# Patient Record
Sex: FEMALE | Race: WHITE | NOT HISPANIC OR LATINO | Employment: OTHER | URBAN - METROPOLITAN AREA
[De-identification: names, ages, dates, MRNs, and addresses within clinical notes are randomized per-mention and may not be internally consistent; named-entity substitution may affect disease eponyms.]

---

## 2022-04-01 ENCOUNTER — APPOINTMENT (OUTPATIENT)
Dept: GENERAL RADIOLOGY | Facility: CLINIC | Age: 82
End: 2022-04-01
Attending: EMERGENCY MEDICINE
Payer: COMMERCIAL

## 2022-04-01 ENCOUNTER — HOSPITAL ENCOUNTER (EMERGENCY)
Facility: CLINIC | Age: 82
Discharge: HOME | End: 2022-04-01
Attending: EMERGENCY MEDICINE | Admitting: EMERGENCY MEDICINE
Payer: COMMERCIAL

## 2022-04-01 VITALS
RESPIRATION RATE: 18 BRPM | HEART RATE: 57 BPM | TEMPERATURE: 97.3 F | OXYGEN SATURATION: 99 % | SYSTOLIC BLOOD PRESSURE: 168 MMHG | DIASTOLIC BLOOD PRESSURE: 84 MMHG

## 2022-04-01 DIAGNOSIS — I10 HYPERTENSION, UNSPECIFIED TYPE: ICD-10-CM

## 2022-04-01 DIAGNOSIS — R07.89 OTHER CHEST PAIN: ICD-10-CM

## 2022-04-01 LAB
ANION GAP SERPL CALCULATED.3IONS-SCNC: 4 MMOL/L (ref 3–14)
ATRIAL RATE - MUSE: 61 BPM
ATRIAL RATE - MUSE: 68 BPM
BASOPHILS # BLD AUTO: 0 10E3/UL (ref 0–0.2)
BASOPHILS NFR BLD AUTO: 1 %
BUN SERPL-MCNC: 16 MG/DL (ref 7–30)
CALCIUM SERPL-MCNC: 9 MG/DL (ref 8.5–10.1)
CHLORIDE BLD-SCNC: 104 MMOL/L (ref 94–109)
CO2 SERPL-SCNC: 30 MMOL/L (ref 20–32)
CREAT SERPL-MCNC: 0.89 MG/DL (ref 0.52–1.04)
DIASTOLIC BLOOD PRESSURE - MUSE: NORMAL MMHG
DIASTOLIC BLOOD PRESSURE - MUSE: NORMAL MMHG
EOSINOPHIL # BLD AUTO: 0 10E3/UL (ref 0–0.7)
EOSINOPHIL NFR BLD AUTO: 1 %
ERYTHROCYTE [DISTWIDTH] IN BLOOD BY AUTOMATED COUNT: 13.3 % (ref 10–15)
GFR SERPL CREATININE-BSD FRML MDRD: 66 ML/MIN/1.73M2
GLUCOSE BLD-MCNC: 99 MG/DL (ref 70–99)
HCT VFR BLD AUTO: 44.5 % (ref 35–47)
HGB BLD-MCNC: 14.5 G/DL (ref 11.7–15.7)
HOLD SPECIMEN: NORMAL
IMM GRANULOCYTES # BLD: 0 10E3/UL
IMM GRANULOCYTES NFR BLD: 0 %
INTERPRETATION ECG - MUSE: NORMAL
INTERPRETATION ECG - MUSE: NORMAL
LYMPHOCYTES # BLD AUTO: 1.1 10E3/UL (ref 0.8–5.3)
LYMPHOCYTES NFR BLD AUTO: 27 %
MCH RBC QN AUTO: 32.4 PG (ref 26.5–33)
MCHC RBC AUTO-ENTMCNC: 32.6 G/DL (ref 31.5–36.5)
MCV RBC AUTO: 100 FL (ref 78–100)
MONOCYTES # BLD AUTO: 0.2 10E3/UL (ref 0–1.3)
MONOCYTES NFR BLD AUTO: 5 %
NEUTROPHILS # BLD AUTO: 2.9 10E3/UL (ref 1.6–8.3)
NEUTROPHILS NFR BLD AUTO: 66 %
NRBC # BLD AUTO: 0 10E3/UL
NRBC BLD AUTO-RTO: 0 /100
P AXIS - MUSE: 24 DEGREES
P AXIS - MUSE: 43 DEGREES
PLAT MORPH BLD: NORMAL
PLATELET # BLD AUTO: 144 10E3/UL (ref 150–450)
POTASSIUM BLD-SCNC: 3.8 MMOL/L (ref 3.4–5.3)
PR INTERVAL - MUSE: 170 MS
PR INTERVAL - MUSE: 174 MS
QRS DURATION - MUSE: 88 MS
QRS DURATION - MUSE: 90 MS
QT - MUSE: 448 MS
QT - MUSE: 458 MS
QTC - MUSE: 461 MS
QTC - MUSE: 476 MS
R AXIS - MUSE: -46 DEGREES
R AXIS - MUSE: -46 DEGREES
RBC # BLD AUTO: 4.47 10E6/UL (ref 3.8–5.2)
RBC MORPH BLD: NORMAL
SODIUM SERPL-SCNC: 138 MMOL/L (ref 133–144)
SYSTOLIC BLOOD PRESSURE - MUSE: NORMAL MMHG
SYSTOLIC BLOOD PRESSURE - MUSE: NORMAL MMHG
T AXIS - MUSE: 5 DEGREES
T AXIS - MUSE: 9 DEGREES
TROPONIN I SERPL HS-MCNC: 5 NG/L
VENTRICULAR RATE- MUSE: 61 BPM
VENTRICULAR RATE- MUSE: 68 BPM
WBC # BLD AUTO: 4.2 10E3/UL (ref 4–11)

## 2022-04-01 PROCEDURE — 71046 X-RAY EXAM CHEST 2 VIEWS: CPT

## 2022-04-01 PROCEDURE — 36415 COLL VENOUS BLD VENIPUNCTURE: CPT | Performed by: EMERGENCY MEDICINE

## 2022-04-01 PROCEDURE — 93005 ELECTROCARDIOGRAM TRACING: CPT

## 2022-04-01 PROCEDURE — 85025 COMPLETE CBC W/AUTO DIFF WBC: CPT | Performed by: EMERGENCY MEDICINE

## 2022-04-01 PROCEDURE — 80048 BASIC METABOLIC PNL TOTAL CA: CPT | Performed by: EMERGENCY MEDICINE

## 2022-04-01 PROCEDURE — 99285 EMERGENCY DEPT VISIT HI MDM: CPT | Mod: 25

## 2022-04-01 PROCEDURE — 93005 ELECTROCARDIOGRAM TRACING: CPT | Mod: 76

## 2022-04-01 PROCEDURE — 84484 ASSAY OF TROPONIN QUANT: CPT | Performed by: EMERGENCY MEDICINE

## 2022-04-01 ASSESSMENT — ENCOUNTER SYMPTOMS
NAUSEA: 0
DIAPHORESIS: 0
SHORTNESS OF BREATH: 0
PALPITATIONS: 0
NECK PAIN: 1

## 2022-04-01 NOTE — ED PROVIDER NOTES
"  History     Chief Complaint:  Chest Pain     HPI:  The history is provided by the patient and the spouse.      Eri Smith is a 81 year old female with a history of PVD, TIA, hyperlipidemia who presents with mid-sternal chest pain which began approximately 8.5 hours ago around 0300 this morning. She reports that she woke up at this time with a mid-sternal chest discomfort which she describes as a \"strong aching\" sensation. Shortly after this, her chest pain began to radiate to her jaw and the right side of her neck. These symptoms remained constant for approximately 4.5 hours until her symptoms resolved around 0730. She denies any associated symptoms such as shortness of breath, palpitations, nausea, or diaphoresis. Approximately 3 weeks ago on 3/10, Eri reports that she had an episode of similar symptoms. She states that she was folding some clothes when she suddenly developed a chest pain, which she also described as a strong ache. The chest pain radiated outwards. After laying down and resting for 20 minutes, the pain resolved. She denies any possible trigger for these symptoms such as recent infections, use of antibiotics, trauma/injury, travel. Her  does note that she has been feeling anxious about an upcoming trip she is taking by herself. Eri notes a strong family history of heart disease.    Review of Systems   Constitutional: Negative for diaphoresis.   HENT:        Jaw pain +   Respiratory: Negative for shortness of breath.    Cardiovascular: Positive for chest pain. Negative for palpitations.   Gastrointestinal: Negative for nausea.   Musculoskeletal: Positive for neck pain.   All other systems reviewed and are negative.    Allergies:  Codeine    Medications:  Aspirin 81 mg  Benadryl    Past Medical History:     Osteoarthritis  Diverticulosis  Migraine  Tobacco abuse  PVD  Cataracts  Colon polyp   Osteoporosis  Osteopenia  Hemorrhoids   Impaired glucose " tolerance  Hyperlipidemia  TIA    Past Surgical History:    Appendectomy   Tonsillectomy  Adenoidectomy  Bunionectomy, bilateral  Hammer toe repair  Ectopic pregnancy surgery  Total knee replacement, bilateral   Tubal ligation  Colonoscopy   Ovary removal, right    Family History:    Father: colon cancer, colon polyps,coronary artery disease  Mother: breast cancer, cataracts, heart disease, arthritis  Sisters: colon cancer, cataracts, dementia, heart disease, retinal detachment, Parkinsonism    Social History:  The patient presents to the ED with her .  The patient presents to the ED via car.    Physical Exam     Patient Vitals for the past 24 hrs:   BP Temp Temp src Pulse Resp SpO2   04/01/22 1353 (!) 168/84 -- -- 57 18 99 %   04/01/22 1230 (!) 144/86 -- -- 52 20 97 %   04/01/22 1215 (!) 161/91 -- -- 56 11 97 %   04/01/22 0948 (!) 162/100 97.3  F (36.3  C) Temporal 72 20 98 %     Physical Exam  General: The patient is alert, in no respiratory distress. Anxious.    HENT: Mucous membranes moist.    Cardiovascular: Regular rate and rhythm. Good pulses in all four extremities. Normal capillary refill and skin turgor.     Respiratory: Lungs are clear. No nasal flaring. No retractions. No wheezing, no crackles.    Gastrointestinal: Abdomen soft. No guarding, no rebound. No palpable hernias.     Musculoskeletal: No gross deformity.     Skin: No rashes or petechiae.     Neurologic: The patient is alert and oriented x3. GCS 15. No testable cranial nerve deficit. Follows commands with clear and appropriate speech. Gives appropriate answers. Good strength in all extremities. No gross neurologic deficit. Gross sensation intact. Pupils are round and reactive. No meningismus.     Lymphatic: No cervical adenopathy. No lower extremity swelling.    Psychiatric: The patient is non-tearful. Anxious.    Emergency Department Course     ECG #1:  ECG taken at 0956, ECG read at 1135  Normal sinus rhythm  Left axis deviation  Rate  68 bpm. AR interval 170 ms. QRS duration 88 ms. QT/QTc 488/476 ms. P-R-T axes 43 -46 9.     ECG #2:  ECG taken at 1154, ECG read at 1156  Normal sinus rhythm  Left axis deviation  Rate 61 bpm. AR interval 174 ms. QRS duration 90 ms. QT/QTc 458/461 ms. P-R-T axes 24 -46 5.      Imaging:  XR Chest 2 Views   Preliminary Result   IMPRESSION: There are no acute infiltrates. The cardiac silhouette is   not enlarged. Pulmonary vasculature is unremarkable.      Report per radiology    Laboratory:  Labs Ordered and Resulted from Time of ED Arrival to Time of ED Departure   CBC WITH PLATELETS AND DIFFERENTIAL - Abnormal       Result Value    WBC Count 4.2      RBC Count 4.47      Hemoglobin 14.5      Hematocrit 44.5            MCH 32.4      MCHC 32.6      RDW 13.3      Platelet Count 144 (*)     % Neutrophils 66      % Lymphocytes 27      % Monocytes 5      % Eosinophils 1      % Basophils 1      % Immature Granulocytes 0      NRBCs per 100 WBC 0      Absolute Neutrophils 2.9      Absolute Lymphocytes 1.1      Absolute Monocytes 0.2      Absolute Eosinophils 0.0      Absolute Basophils 0.0      Absolute Immature Granulocytes 0.0      Absolute NRBCs 0.0     BASIC METABOLIC PANEL - Normal    Sodium 138      Potassium 3.8      Chloride 104      Carbon Dioxide (CO2) 30      Anion Gap 4      Urea Nitrogen 16      Creatinine 0.89      Calcium 9.0      Glucose 99      GFR Estimate 66     TROPONIN I - Normal    Troponin I High Sensitivity 5     RBC AND PLATELET MORPHOLOGY    Platelet Assessment        Value: Automated Count Confirmed. Platelet morphology is normal.    RBC Morphology Confirmed RBC Indices        Emergency Department Course:       Reviewed:  I reviewed nursing notes, vitals, past medical history, Care Everywhere    Assessments:  1138 I obtained history and examined the patient as noted above.   1346 I rechecked the patient and explained findings.     Disposition:  The patient was discharged to home.      Impression & Plan     Medical Decision Making:  Eri Smith is a 81 year old female who presents to the emergency department for evaluation of chest pain associated with right-sided jaw pain.  This has been there for several hours this morning and have no associated symptoms.  I did discuss the patient that there are other causes besides ACS and considered PE pneumonia pneumothorax dissection pancreatitis amongst others.  The patient's screening troponin is negative and at this point after having pain for many hours today and negative troponin is very predictive I stressed she would need outpatient follow-up and she was discharged home in good condition with no current signs of ischemia.    Diagnosis:    ICD-10-CM    1. Other chest pain  R07.89    2. Hypertension, unspecified type  I10      Scribe Disclosure:  I, Dora Monroy, am serving as a scribe at 11:33 AM on 4/1/2022 to document services personally performed by Daquan Larkin MD based on my observations and the provider's statements to me.        Daquan Larkin MD  04/01/22 3418

## 2022-04-01 NOTE — ED TRIAGE NOTES
Arrives from urgent care with complaints of chest, jaw, and neck pain that started at 0300 and has since gone away. Alert and oriented, denies pain or other complaints at this time, ABCs intact.

## 2024-01-22 ENCOUNTER — APPOINTMENT (OUTPATIENT)
Dept: CT IMAGING | Facility: CLINIC | Age: 84
End: 2024-01-22
Attending: EMERGENCY MEDICINE
Payer: COMMERCIAL

## 2024-01-22 PROCEDURE — 70450 CT HEAD/BRAIN W/O DYE: CPT

## 2024-01-22 PROCEDURE — 99284 EMERGENCY DEPT VISIT MOD MDM: CPT | Mod: 25

## 2024-01-22 PROCEDURE — 12001 RPR S/N/AX/GEN/TRNK 2.5CM/<: CPT

## 2024-01-23 ENCOUNTER — HOSPITAL ENCOUNTER (EMERGENCY)
Facility: CLINIC | Age: 84
Discharge: HOME | End: 2024-01-23
Attending: EMERGENCY MEDICINE | Admitting: EMERGENCY MEDICINE
Payer: COMMERCIAL

## 2024-01-23 VITALS
HEART RATE: 70 BPM | TEMPERATURE: 98 F | DIASTOLIC BLOOD PRESSURE: 78 MMHG | WEIGHT: 133 LBS | HEIGHT: 60 IN | BODY MASS INDEX: 26.11 KG/M2 | OXYGEN SATURATION: 98 % | SYSTOLIC BLOOD PRESSURE: 130 MMHG | RESPIRATION RATE: 20 BRPM

## 2024-01-23 DIAGNOSIS — S01.01XA SCALP LACERATION, INITIAL ENCOUNTER: ICD-10-CM
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
## 2024-01-23 RX ORDER — MULTIPLE VITAMINS W/ MINERALS TAB 9MG-400MCG
1 TAB ORAL DAILY
COMMUNITY

## 2024-01-23 RX ORDER — LIDOCAINE HYDROCHLORIDE AND EPINEPHRINE 10; 10 MG/ML; UG/ML
INJECTION, SOLUTION INFILTRATION; PERINEURAL
Status: DISCONTINUED
Start: 2024-01-23 | End: 2024-01-23 | Stop reason: HOSPADM

## 2024-01-23 RX ORDER — DONEPEZIL HYDROCHLORIDE 10 MG/1
10 TABLET, FILM COATED ORAL AT BEDTIME
COMMUNITY

## 2024-01-23 ASSESSMENT — ACTIVITIES OF DAILY LIVING (ADL): ADLS_ACUITY_SCORE: 35

## 2024-01-23 NOTE — DISCHARGE INSTRUCTIONS
Discharge Instructions  Laceration (Cut)    You were seen today for a laceration (cut).  Your provider examined your laceration for any problems such a buried foreign body (like glass, a splinter, or gravel), or injury to blood vessels, tendons, and nerves.  Your provider may have also rinsed and/or scrubbed your laceration to help prevent an infection. It may not be possible to find all problems with your laceration on the first visit; occasionally foreign bodies or a tendon injury can go undetected.    Your laceration may have been closed in one of several ways:  No closure: many wounds will heal just fine without closure.  Stitches: regular stitches that require removal.  Staples: skin staples are often used in the scalp/head.  Wound adhesive (glue): skin glue can be used for certain lacerations and doesn t require removal.  Wound strips (aka Butterfly bandages or steri-strips): these are bandages that help to close a wound.  Absorbable stitches:  dissolving  stitches that go away on their own and usually don t require removal.    A small percentage of wounds will develop an infection regardless of how well the wound is cared for. Antibiotics are generally not indicated to prevent an infection so are only given for a small number of high-risk wounds. Some lacerations are too high risk to close, and are left open to heal because closure can increase the likelihood that an infection will develop.    Remember that all lacerations, no matter how expertly repaired, will cause scarring. We consider many factors, techniques, and materials, in our efforts to provide the best possible cosmetic outcome.    Generally, every Emergency Department visit should have a follow-up clinic visit with either a primary or a specialty clinic/provider. Please follow-up as instructed by your emergency provider today.     Return to the Emergency Department right away if:  You have more redness, swelling, pain, drainage (pus), a bad smell,  or red streaking from your laceration as these symptoms could indicate an infection.  You have a fever of 100.4 F or more.  You have bleeding that you cannot stop at home. If your cut starts to bleed, hold pressure on the bleeding area with a clean cloth or put pressure over the bandage.  If the bleeding does not stop after using constant pressure for 30 minutes, you should return to the Emergency Department for further treatment.  An area past the laceration is cool, pale, or blue compared with the other side, or has a slower return of color when squeezed.  Your dressing seems too tight or starts to get uncomfortable or painful. For children, signs of a problem might be irritability or restlessness.  You have loss of normal function or use of an area, such as being unable to straighten or bend a finger normally.  You have a numb area past the laceration.    Return to the Emergency Department or see your regular provider if:  The laceration starts to come open.   You have something coming out of the cut or a feeling that there is something in the laceration.  Your wound will not heal, or keeps breaking open. There can always be glass, wood, dirt or other things in any wound.  They will not always show up, even on x-rays.  If a wound does not heal, this may be why, and it is important to follow-up with your regular provider.    Home Care:  Take your dressing off in 12-24 hours, or as instructed by your provider, to check your laceration. Remove the dressing sooner if it seems too tight or painful, or if it is getting numb, tingly, or pale past the dressing.  Gently wash your laceration 1-2 times daily with clean water and mild soap. It is okay to shower or run clean water over the laceration, but do not let the laceration soak in water (no swimming).  If your laceration was closed with wound adhesive or strips: pat it dry and leave it open to the air. For all other repairs: after you wash your laceration, or at least  2 times a day, apply antibiotic ointment (such as Neosporin  or Bacitracin ) to the laceration, then cover it with a Band-Aid  or gauze.  Keep the laceration clean. Wear gloves or other protective clothing if you are around dirt.    Follow-up for removal:  If your wound was closed with staples or regular stitches, they need to be removed according to the instructions and timeline specified by your provider today.  If your wound was closed with absorbable ( dissolving ) sutures, they should fall out, dissolve, or not be visible in about one week. If they are still visible, then they should be removed according to the instructions and timeline specified by your provider today.    Scars:  To help minimize scarring:  Wear sunscreen over the healed laceration when out in the sun.  Massage the area regularly once healed.  You may apply Vitamin E to the healed wound.  Wait. Scars improve in appearance over months and years.    If you were given a prescription for medicine here today, be sure to read all of the information (including the package insert) that comes with your prescription.  This will include important information about the medicine, its side effects, and any warnings that you need to know about.  The pharmacist who fills the prescription can provide more information and answer questions you may have about the medicine.  If you have questions or concerns that the pharmacist cannot address, please call or return to the Emergency Department.       Remember that you can always come back to the Emergency Department if you are not able to see your regular provider in the amount of time listed above, if you get any new symptoms, or if there is anything that worries you.    ~~~~~~~~~~~~~~    Discharge Instructions  Head Injury    You have been seen today for a head injury. Your evaluation included a history and physical examination. You may have had a CT (CAT) scan performed, though most head injuries do not require  a scan. Based on this evaluation, your provider today does not feel that your head injury is serious.    Generally, every Emergency Department visit should have a follow-up clinic visit with either a primary or a specialty clinic/provider. Please follow-up as instructed by your emergency provider today.  Return to the Emergency Department if:  You are confused or you are not acting right.  Your headache gets worse or you start to have a really bad headache even with your recommended treatment plan.  You vomit (throw up) more than once.  You have a seizure.  You have trouble walking.  You have weakness or paralysis (cannot move) in an arm or a leg.  You have blood or fluid coming from your ears or nose.  You have new symptoms or anything that worries you.    Sleeping:  It is okay for you to sleep, but someone should wake you up if instructed by your provider, and someone should check on you at your usual time to wake up.     Activity:  Do not drive for at least 24 hours.  Do not drive if you have dizzy spells or trouble concentrating, or remembering things.  Do not return to any contact sports until cleared by your regular provider.     MORE INFORMATION:    Concussion:  A concussion is a minor head injury that may cause temporary problems with the way the brain works. Although concussions are important, they are generally not an emergency or a reason that a person needs to be hospitalized. Some concussion symptoms include confusion, amnesia (forgetful), nausea (sick to your stomach) and vomiting (throwing up), dizziness, fatigue, memory or concentration problems, irritability and sleep problems. For most people, concussions are mild and temporary but some will have more severe and persistent symptoms that require on-going care and treatment.  CT Scans: Your evaluation today may have included a CT scan (CAT scan) to look for things like bleeding or a skull fracture (broken bone).  CT scans involve radiation and too  many CT scans can cause serious health problems like cancer, especially in children.  Because of this, your provider may not have ordered a CT scan today if they think you are at low risk for a serious or life threatening problem.    If you were given a prescription for medicine here today, be sure to read all of the information (including the package insert) that comes with your prescription.  This will include important information about the medicine, its side effects, and any warnings that you need to know about.  The pharmacist who fills the prescription can provide more information and answer questions you may have about the medicine.  If you have questions or concerns that the pharmacist cannot address, please call or return to the Emergency Department.     Remember that you can always come back to the Emergency Department if you are not able to see your regular provider in the amount of time listed above, if you get any new symptoms, or if there is anything that worries you.

## 2024-01-23 NOTE — ED PROVIDER NOTES
History     Chief Complaint:  Head Laceration     The history is provided by the patient.      Eri Smith is a 83 year old female with a history of Alzheimer's dementia who presents due to head laceration. The patient reports that she slipped and fell in the shower causing a laceration to the back of her head. She denies loss of consciousness or any other injuries.  Denies chest pain, shortness of breath, numbness or weakness.    Independent Historian:   None - Patient Only    Review of External Notes:   I reviewed Care Everywhere and updated EPIC.      Medications:    Aricept  Multivitamin    Past Medical History:    Past Medical History:   Diagnosis   Alzheimer's dementia   Diverticulosis of large intestine   Dyslipidemia   Migraine   Osteopenia     Past Surgical History:    Past Surgical History:   Procedure   APPENDECTOMY   BUNIONECTOMY   Ectopic pregnancy surgery   Hammertoe repair   Ovary removal   Tonsillectomy and adenoidectomy      Physical Exam   Patient Vitals for the past 24 hrs:   BP Temp Temp src Pulse Resp SpO2 Height Weight   01/23/24 0230 133/76 -- -- 70 18 99 % -- --   01/22/24 2330 -- -- -- -- -- -- 1.524 m (5') 60.3 kg (133 lb)   01/22/24 2328 142/118 96.9  F (36.1  C) Temporal 71 20 99 % -- --      Physical Exam  Nursing note and vitals reviewed.  Constitutional: Cooperative.   HENT:   Mouth/Throat: Mucous membranes are normal.   Freely moving neck.  No spinous process tenderness  Eyes: Pupils are equal, round, and reactive to light.  Extraocular movements intact  Cardiovascular: Normal rate, regular rhythm and normal heart sounds.  No murmur.  Pulmonary/Chest: Effort normal and breath sounds normal. No respiratory distress. No wheezes. No rales.   Abdominal: Soft. Normal appearance. There is no tenderness. There is no rigidity and no guarding.   Musculoskeletal: Normal range of motion of all extremities.   Neurological: Alert.  Oriented to self.  Strength normal.  GCS 15.  Cranial nerves  II through XII intact.  Skin: Skin is warm and dry. No rash noted. Laceration the posterior scalp with underlying hematoma.   Psychiatric: Normal mood and affect.     Emergency Department Course     Imaging:  Head CT w/o contrast   Final Result   IMPRESSION:   1.  No CT evidence for acute intracranial process.   2.  Brain atrophy and presumed chronic microvascular ischemic changes as above.      Report per radiology.      Procedures     Laceration Repair      Procedure: Laceration Repair    Indication: Laceration    Consent: Verbal    Location: Posterior Scalp     Length: 1.8 cm    Preparation: Irrigation with Sterile Saline.    Anesthesia/Sedation: Lidocaine with Epinephrine - 1%      Treatment/Exploration: Wound explored, no foreign bodies found     Closure: The wound was closed with  4 staples.    Patient Status: The patient tolerated the procedure well: Yes. There were no complications.    Interventions:  None    Assessments:  0248 I obtained history and examined the patient as noted above.   0313 I rechecked the patient and performed a laceration repair procedure. See procedure note above. The patient tolerated the procedure well. We discussed plan for discharge and the patient in agreement with this plan.     Independent Interpretation (X-rays, CTs, rhythm strip):  None    Consultations/Discussion of Management or Tests:  None      Social Determinants of Health affecting care:   None    Disposition:  The patient was discharged to home.     Impression & Plan      Medical Decision Making:  Eri Smith is a 83 year old female presents to the emergency department with head injury and scalp laceration. Please review HPI for further details. This was clearly a mechanical fall, not syncope.  There were no prodromal symptoms so I doubt stroke, cardiac arrhythmia, or other serious etiology.      Differential of head injury includes epidural hematoma, subdural hematoma, skull fracture, concussion intracerebral  hemorrhage and traumatic subarachnoid hemorrhage. CT head unremarkable without evidence of hemorrhage. C-spine cleared clinically and no indication for advanced cervical spine imaging. The patient's laceration was cleaned and examined and no evidence of deep structure injury.  The laceration was repaired as noted above.     Plan for discharge home and close follow up with primary care provider early next week. Return for red flag symptoms including severe headache, confusion, vision changes, vomiting, or any other new/concerning symptoms. Patient and daughter agrees with the plan and all questions and concerns addressed prior to discharge home.       Diagnosis:    ICD-10-CM    1. Fall in shower  W18.2XXA       2. Scalp laceration, initial encounter  S01.01XA             Scribe Disclosure:  IAntoinette, am serving as a scribe at 2:37 AM on 1/23/2024 to document services personally performed by David Andino MD based on my observations and the provider's statements to me.     1/23/2024   David Andino MD Amdahl, John, MD  01/23/24 0431

## 2024-01-23 NOTE — ED TRIAGE NOTES
Pt arrives with her daughter. History of Alzheimers. Fall happened about 10pm [place]. No loss of consciousness. Fell in the shower. Small lac to back of head. Bleeding controlled. Denies any other injuries.     mechanical fall. Not on on blood thinners.

## 2024-01-31 ENCOUNTER — HOSPITAL ENCOUNTER (EMERGENCY)
Facility: CLINIC | Age: 84
Discharge: HOME | End: 2024-01-31
Payer: COMMERCIAL

## 2024-01-31 VITALS
RESPIRATION RATE: 18 BRPM | SYSTOLIC BLOOD PRESSURE: 152 MMHG | DIASTOLIC BLOOD PRESSURE: 101 MMHG | WEIGHT: 133 LBS | OXYGEN SATURATION: 96 % | HEART RATE: 75 BPM | BODY MASS INDEX: 25.97 KG/M2 | TEMPERATURE: 98.7 F

## 2024-01-31 NOTE — ED TRIAGE NOTES
Pt here for staple removal. Was seen on 23rd after falling in BR and hitting back of head.      Triage Assessment (Adult)       Row Name 01/31/24 0926          Triage Assessment    Airway WDL WDL        Respiratory WDL    Respiratory WDL WDL        Skin Circulation/Temperature WDL    Skin Circulation/Temperature WDL WDL        Cardiac WDL    Cardiac WDL WDL        Peripheral/Neurovascular WDL    Peripheral Neurovascular WDL WDL        Cognitive/Neuro/Behavioral WDL    Cognitive/Neuro/Behavioral WDL WDL

## 2024-01-31 NOTE — ED NOTES
4 staples removed, wound healed well. Pt tolerated well. No concerns at this time.     Not seen by an MD- nurse only

## 2024-03-26 ENCOUNTER — HOSPITAL ENCOUNTER (EMERGENCY)
Facility: CLINIC | Age: 84
Discharge: HOME | End: 2024-03-26
Attending: EMERGENCY MEDICINE | Admitting: EMERGENCY MEDICINE
Payer: COMMERCIAL

## 2024-03-26 VITALS
TEMPERATURE: 97.9 F | SYSTOLIC BLOOD PRESSURE: 141 MMHG | RESPIRATION RATE: 20 BRPM | HEART RATE: 57 BPM | DIASTOLIC BLOOD PRESSURE: 83 MMHG | OXYGEN SATURATION: 94 %

## 2024-03-26 DIAGNOSIS — R19.5 STOOL DISCOLORATION: ICD-10-CM

## 2024-03-26 DIAGNOSIS — R11.11 VOMITING WITHOUT NAUSEA, UNSPECIFIED VOMITING TYPE: ICD-10-CM

## 2024-03-26 LAB
ANION GAP SERPL CALCULATED.3IONS-SCNC: 13 MMOL/L (ref 7–15)
BASOPHILS # BLD AUTO: 0 10E3/UL (ref 0–0.2)
BASOPHILS NFR BLD AUTO: 1 %
BUN SERPL-MCNC: 13.2 MG/DL (ref 8–23)
CALCIUM SERPL-MCNC: 9.4 MG/DL (ref 8.8–10.2)
CHLORIDE SERPL-SCNC: 100 MMOL/L (ref 98–107)
CREAT SERPL-MCNC: 0.8 MG/DL (ref 0.51–0.95)
DEPRECATED HCO3 PLAS-SCNC: 27 MMOL/L (ref 22–29)
EGFRCR SERPLBLD CKD-EPI 2021: 74 ML/MIN/1.73M2
EOSINOPHIL # BLD AUTO: 0.1 10E3/UL (ref 0–0.7)
EOSINOPHIL NFR BLD AUTO: 1 %
ERYTHROCYTE [DISTWIDTH] IN BLOOD BY AUTOMATED COUNT: 14.7 % (ref 10–15)
GLUCOSE SERPL-MCNC: 109 MG/DL (ref 70–99)
HCT VFR BLD AUTO: 47.2 % (ref 35–47)
HEMOCCULT STL QL: NEGATIVE
HGB BLD-MCNC: 15.6 G/DL (ref 11.7–15.7)
HOLD SPECIMEN: NORMAL
HOLD SPECIMEN: NORMAL
IMM GRANULOCYTES # BLD: 0 10E3/UL
IMM GRANULOCYTES NFR BLD: 0 %
LYMPHOCYTES # BLD AUTO: 1.2 10E3/UL (ref 0.8–5.3)
LYMPHOCYTES NFR BLD AUTO: 28 %
MCH RBC QN AUTO: 32.1 PG (ref 26.5–33)
MCHC RBC AUTO-ENTMCNC: 33.1 G/DL (ref 31.5–36.5)
MCV RBC AUTO: 97 FL (ref 78–100)
MONOCYTES # BLD AUTO: 0.2 10E3/UL (ref 0–1.3)
MONOCYTES NFR BLD AUTO: 6 %
NEUTROPHILS # BLD AUTO: 2.8 10E3/UL (ref 1.6–8.3)
NEUTROPHILS NFR BLD AUTO: 64 %
NRBC # BLD AUTO: 0 10E3/UL
NRBC BLD AUTO-RTO: 0 /100
PLATELET # BLD AUTO: 156 10E3/UL (ref 150–450)
POTASSIUM SERPL-SCNC: 4 MMOL/L (ref 3.4–5.3)
RBC # BLD AUTO: 4.86 10E6/UL (ref 3.8–5.2)
SODIUM SERPL-SCNC: 140 MMOL/L (ref 135–145)
WBC # BLD AUTO: 4.3 10E3/UL (ref 4–11)

## 2024-03-26 PROCEDURE — 99283 EMERGENCY DEPT VISIT LOW MDM: CPT

## 2024-03-26 PROCEDURE — 36415 COLL VENOUS BLD VENIPUNCTURE: CPT | Performed by: EMERGENCY MEDICINE

## 2024-03-26 PROCEDURE — 85025 COMPLETE CBC W/AUTO DIFF WBC: CPT | Performed by: EMERGENCY MEDICINE

## 2024-03-26 PROCEDURE — 84295 ASSAY OF SERUM SODIUM: CPT | Performed by: EMERGENCY MEDICINE

## 2024-03-26 PROCEDURE — 82272 OCCULT BLD FECES 1-3 TESTS: CPT | Performed by: EMERGENCY MEDICINE

## 2024-03-26 RX ORDER — ONDANSETRON 4 MG/1
4 TABLET, ORALLY DISINTEGRATING ORAL EVERY 8 HOURS PRN
Qty: 10 TABLET | Refills: 0 | Status: SHIPPED | OUTPATIENT
Start: 2024-03-26 | End: 2024-03-29

## 2024-03-26 ASSESSMENT — ACTIVITIES OF DAILY LIVING (ADL): ADLS_ACUITY_SCORE: 35

## 2024-03-26 NOTE — ED PROVIDER NOTES
"  History     Chief Complaint:  Melena     The history is provided by the patient.      Eri Smith is a 83 year old female with history of alzheimer's and hyperlipidemia, who presents to the ED with her  for evaluation of melena. The patient reports that 3 days ago, she began vomiting and she had 2 episodes of emesis that looked normal. This morning, she had an \"explosive bowel movement that was black.\" She did not have any black stool yesterday or the day before. Denies fever, abdominal pain, or use of Pepto bismol or iron pills. Her  reports that the patient has had a few episodes of emesis the past couple of days, and that the patient had the flu 2 weeks ago which resolved after 24 hours.  states that the patient didn't go to her exercise one day and told him that she had the flu, but she did not elaborate on her symptoms. Patient denies blood thinners use or having a scope to look at her stomach.  mentions that the patient has subtle heart issues, which she has been on a monitor for and she has a stress test tomorrow.    Independent Historian:   Spouse/Partner - They report as noted above.    Review of External Notes:   None    Medications:    Aricept  Aspirin 81 mg  Amoxil     Past Medical History:    Alzheimer's dementia  Diverticulosis of large intestine  Dyslipidemia  Migraines  Osteopenia  Peripheral vascular disease  Osteoarthritis   Tobacco use disorder  Adenomatous polyp of colon  Hemorrhoids  Ocular migraine  Hyperlipidemia benign neoplasm of colon  Impaired glucose tolerance   Arthritis  Osteoporosis   Tinnitus of both ears  Bilateral sensorineural hearing loss     Past Surgical History:    Appendectomy  Bunionectomy, bilateral   Right foot surgery   Ectopic pregnancy surgery  Hammertoe repair  Right ovary removal  Tonsillectomy and adenoidectomy  Bilateral total knee arthroplasty   Tubal ligation  Colonoscopy    Physical Exam   Patient Vitals for the past 24 hrs:   BP " Temp Temp src Pulse Resp SpO2   03/26/24 1130 (!) 141/83 -- -- 57 -- 94 %   03/26/24 1120 -- -- -- -- -- 97 %   03/26/24 1110 -- -- -- -- -- 99 %   03/26/24 1100 (!) 149/89 -- -- 60 -- 97 %   03/26/24 1050 (!) 138/92 -- -- -- -- 96 %   03/26/24 1019 (!) 184/105 97.9  F (36.6  C) Temporal 71 20 98 %      Physical Exam      HEENT:    Oropharynx is moist  Eyes:    Conjunctiva normal  Neck:     Supple, no meningismus.     CV:     Regular rate and rhythm.      No murmurs, rubs or gallops.     No lower extremity edema.  PULM:    Clear to auscultation bilateral.       No respiratory distress.      Good air exchange.     No rales or wheezing.     No stridor.  ABD:    Soft, non-distended.       No abdominal tenderness.     Bowel sounds normal.     No pulsatile masses.       No rebound, guarding or rigidity.     No CVA tenderness.   :    No external hemorrhoids.     No anal fissure.     Good sphincter tone.      No rectal mass.     No blood on gloved finger.  MSK:     No gross deformity to all four extremities.   LYMPH:   No cervical lymphadenopathy.  NEURO:   Alert.  Good muscular tone, no atrophy.   Skin:    Warm, dry and intact.    Psych:    Mood is good and affect is appropriate.      Emergency Department Course     Laboratory:  Labs Ordered and Resulted from Time of ED Arrival to Time of ED Departure   BASIC METABOLIC PANEL - Abnormal       Result Value    Sodium 140      Potassium 4.0      Chloride 100      Carbon Dioxide (CO2) 27      Anion Gap 13      Urea Nitrogen 13.2      Creatinine 0.80      GFR Estimate 74      Calcium 9.4      Glucose 109 (*)    CBC WITH PLATELETS AND DIFFERENTIAL - Abnormal    WBC Count 4.3      RBC Count 4.86      Hemoglobin 15.6      Hematocrit 47.2 (*)     MCV 97      MCH 32.1      MCHC 33.1      RDW 14.7      Platelet Count 156      % Neutrophils 64      % Lymphocytes 28      % Monocytes 6      % Eosinophils 1      % Basophils 1      % Immature Granulocytes 0      NRBCs per 100 WBC 0       "Absolute Neutrophils 2.8      Absolute Lymphocytes 1.2      Absolute Monocytes 0.2      Absolute Eosinophils 0.1      Absolute Basophils 0.0      Absolute Immature Granulocytes 0.0      Absolute NRBCs 0.0     OCCULT BLOOD STOOL - Normal    Occult Blood Negative              Emergency Department Course & Assessments:  Interventions:  Medications - No data to display     Independent Interpretation (X-rays, CTs, rhythm strip):  None    Assessments/Consultations/Discussion of Management or Tests:  ED Course as of 24 1350   Tue Mar 26, 2024   1033 I obtained history and examined the patient as noted above.      Social Determinants of Health affecting care:   None    Disposition:  The patient was discharged.     Impression & Plan        Medical Decision Makin-year-old female presents with 2 episodes of emesis over the last 48 hours and today a single episode of discolored stool that was \"black.\"  She has no findings to suggest upper GI bleed.  Hemoglobin is stable.  There is no inappropriate elevation of BUN in relation to creatinine.  Rectal examination reveals normal-appearing stool and is Hemoccult negative.  No sinister pathology noted for the discolored stool.  Abdominal examination is otherwise benign and would not require advanced imaging of the abdomen.  Symptoms may be related to viral illness.  She was given a trial of Zofran.  Return to ED for any worsening symptoms.    Diagnosis:    ICD-10-CM    1. Stool discoloration  R19.5       2. Vomiting without nausea, unspecified vomiting type  R11.11            Discharge Medications:  Discharge Medication List as of 3/26/2024 11:38 AM        START taking these medications    Details   ondansetron (ZOFRAN ODT) 4 MG ODT tab Take 1 tablet (4 mg) by mouth every 8 hours as needed for nausea, Disp-10 tablet, R-0, E-Prescribe            Scribe Disclosure:  ALBERTO RIVERA, am serving as a scribe at 10:29 AM on 3/26/2024 to document services personally performed " by Dileep Mckeon MD based on my observations and the provider's statements to me.     3/26/2024   Dileep Mckeon MD Matthews, Jeremiah R, MD  03/26/24 [id number]